# Patient Record
Sex: FEMALE | Race: WHITE | NOT HISPANIC OR LATINO | Employment: FULL TIME | URBAN - METROPOLITAN AREA
[De-identification: names, ages, dates, MRNs, and addresses within clinical notes are randomized per-mention and may not be internally consistent; named-entity substitution may affect disease eponyms.]

---

## 2020-07-10 PROBLEM — M25.473 ANKLE SWELLING: Status: ACTIVE | Noted: 2020-07-10

## 2020-07-10 PROBLEM — R73.03 PRE-DIABETES: Status: ACTIVE | Noted: 2020-07-10

## 2020-07-15 ENCOUNTER — CONSULT (OUTPATIENT)
Dept: BARIATRICS/WEIGHT MGMT | Facility: CLINIC | Age: 25
End: 2020-07-15

## 2020-07-15 VITALS
BODY MASS INDEX: 57.52 KG/M2 | SYSTOLIC BLOOD PRESSURE: 126 MMHG | DIASTOLIC BLOOD PRESSURE: 82 MMHG | WEIGHT: 293 LBS | RESPIRATION RATE: 18 BRPM | HEIGHT: 60 IN | HEART RATE: 92 BPM | TEMPERATURE: 98 F

## 2020-07-15 DIAGNOSIS — R73.03 PRE-DIABETES: ICD-10-CM

## 2020-07-15 DIAGNOSIS — E28.2 PCOS (POLYCYSTIC OVARIAN SYNDROME): ICD-10-CM

## 2020-07-15 DIAGNOSIS — Z01.818 PRE-OP EVALUATION: ICD-10-CM

## 2020-07-15 DIAGNOSIS — Z87.19 HISTORY OF ESOPHAGEAL REFLUX: ICD-10-CM

## 2020-07-15 DIAGNOSIS — E66.01 MORBID OBESITY WITH BMI OF 60.0-69.9, ADULT (HCC): Primary | ICD-10-CM

## 2020-07-15 PROBLEM — Z71.3 DIETARY COUNSELING: Status: ACTIVE | Noted: 2020-07-15

## 2020-07-15 PROBLEM — R60.9 EDEMA: Status: ACTIVE | Noted: 2020-07-10

## 2020-07-15 PROBLEM — F41.8 DEPRESSION WITH ANXIETY: Status: ACTIVE | Noted: 2020-07-15

## 2020-07-15 PROCEDURE — 99205 OFFICE O/P NEW HI 60 MIN: CPT | Performed by: NURSE PRACTITIONER

## 2020-07-15 NOTE — PROGRESS NOTES
"[facility] BARIATRIC SURGERY  [address] [address] [phone]  [address] [id number] [phone]  Dept: [phone]  7/15/2020      Kamila Ashraf.  [id number]  [id number]  1995  female      Chief Complaint of weight gain; unable to maintain weight loss    History of Present Illness:   Kamila is a 25 y.o. female who presents today for evaluation, education and consultation regarding weight loss surgery. The patient is interested in the sleeve gastrectomy.      Diet History:Kamila has been overweight for at least 15 years, has been 35 pounds or more overweight for at least 15 years, has been 100 pounds or more overweight for 15 or more years and started dieting at age 22.  The most weight Kamila lost was 20 pounds on \"no soda\" and maintained the weight loss for \"not long\". Kamila describes her eating habits as snacker and sweets eater. Kamila Ashraf has tried reduced calorie, exercising and no soda among others with success of losing up to 20 pounds, but in each instance regained the weight.    See dietician documentation for complete history.    Bariatric Surgery Evaluation: The patient is being seen for an initial visit for bariatric surgery evaluation.     Bariatric Co-morbidities:  edema, polycystic ovarian disease and depression    Patient Active Problem List   Diagnosis   • Edema   • Pre-diabetes   • Morbid obesity with BMI of 60.0-69.9, adult (CMS/[facility])   • Dietary counseling   • Pre-op evaluation   • PCOS (polycystic ovarian syndrome)   • Depression with anxiety   • History of esophageal reflux       Past Medical History:   Diagnosis Date   • Gallstones        Past Surgical History:   Procedure Laterality Date   • LAPAROSCOPIC CHOLECYSTECTOMY     • WISDOM TOOTH EXTRACTION         No Known Allergies    No current outpatient medications on file.    Social History     Socioeconomic History   • Marital status: "      Spouse name: Not on file   • Number of children: 0   • Years of education: Not on file   • Highest education level: Not on file   Occupational History   • Occupation: pharmacy tech   Tobacco Use   • Smoking status: Former Smoker   • Smokeless tobacco: Never Used   Substance and Sexual Activity   • Alcohol use: Yes     Comment: occ   • Drug use: Never   • Sexual activity: Defer       Family History   Problem Relation Age of Onset   • Obesity Mother    • Diabetes Father    • Stroke Father    • Heart attack Father    • Cancer Father    • Obesity Sister    • Diabetes Sister          Review of Systems:  Review of Systems   All other systems reviewed and are negative.      Physical Exam:  Vital Signs:  Weight: (!) 142 kg (313 lb)   Body mass index is 60.65 kg/m².  Temp: 98 °F (36.7 °C)   Heart Rate: 92   BP: 126/82     Physical Exam   Constitutional: She is oriented to person, place, and time. She appears well-developed and well-nourished.   HENT:   Head: Normocephalic and atraumatic.   Eyes: EOM are normal.   Cardiovascular: Normal rate, regular rhythm and normal heart sounds.   Pulmonary/Chest: Effort normal and breath sounds normal.   Abdominal: Soft. Bowel sounds are normal. She exhibits no distension. There is no tenderness.   Musculoskeletal: Normal range of motion.   Neurological: She is alert and oriented to person, place, and time.   Skin: Skin is warm and dry.   Psychiatric: She has a normal mood and affect. Her behavior is normal. Judgment and thought content normal.   Vitals reviewed.         Assessment:         Kamila Ashraf is a 25 y.o. year old female with medically complicated severe obesity. Weight: (!) 142 kg (313 lb), Body mass index is 60.65 kg/m². and weight related problems including edema, polycystic ovarian disease and depression.    I explained in detail the procedures that we are performing.  All of those procedures can be performed laparoscopically but there is a chance to convert to  open if any technical challenges or complications do occur.  Bariatric surgery is not cosmetic surgery but rather a tool to help a patient make a life-long commitment lifestyle changes including diet, exercise, behavior changes, and taking supplemental vitamins and minerals.    Due to the patient's BMI and co-morbidities they are at a high risk for surgery and will obtain the following:  The patient has been advised that a letter of medical support and a history and physical must be obtained from her primary care physician. A psychological evaluation will be arranged for this patient. CBC, CMP, FLP, TSH and HgbA1C will be drawn. Kamila Ashraf will obtain a pre-operative CXR and EKG.     Kamila Ashraf was screened for sleep apnea in our office today and based on their results she is low risk    Kamila Ashraf will be set up for a pre-operative diagnostic esophagogastroduodenoscopy with biopsy for evaluation. The risks and benefits of the procedure were discussed with the patient in detail and all questions were answered.  Possibility of perforation, bleeding, aspiration, anoxic brain injury, respiratory and/or cardiac arrest and death were discussed.   She received handouts regarding, all questions were answered.     The risks, benefits, alternatives, and potential complications of all of the procedures were explained in detail including, but not limited to death, anesthesia and medication adverse effect/DVT, pulmonary embolism, trocar site/incisional hernia, wound infection, abdominal infection, bleeding, failure to lose weight or gain weight and change in body image, metabolic complications with calcium, thiamine, vitamin B12, folate, iron, and anemia.    The patient was advised to start a high protein, low fat and low carbohydrate diet. The patient was given individualized information by our dietician along with handouts.     The patient was given information regarding the EDGAR educational video. EDGAR is an  internet based educational video which explains the surgical procedure and answers basic questions regarding the procedure. The patient was provided with instructions and a password to watch the video.    The patient was encouraged to start routine exercise including but not limited to 150 minutes per week. The patient received a resistance band along with a handout of exercises.     The consultation plan was reviewed with the patient.    The patient understands the surgical procedures and the different surgical options that are available.  She understands the lifestyle changes that would be required after surgery and has agreed to participate in a pre-operative and postoperative weight management program.  She also expressed understanding of possible risks, had several questions answered and desires to proceed.    I think she is a good candidate for this surgery, and is interested in a sleeve gastrectomy.    Encounter Diagnoses   Name Primary?   • Morbid obesity with BMI of 60.0-69.9, adult (CMS/[facility]) Yes   • PCOS (polycystic ovarian syndrome)    • Pre-diabetes    • History of esophageal reflux    • Pre-op evaluation        Plan:    Patient will have evaluations and follow up with bariatric dieticians and a psychologist before undergoing a multidisciplinary review of her candidacy.  We also discussed the weight loss requirement and rationale, and other program requirements.      Reshma Armstrong, APRN  7/15/2020

## 2020-07-16 NOTE — PROGRESS NOTES
"Bariatric Nutrition Counseling Interview    Patient Name:  Kamila Ashraf  YOB: 1995  Age:  25 y.o.  Sex:  female  MRN: [id number]  Date:  07/16/20    Procedure Considering:  Sleeve    Last Documented Height:    Ht Readings from Last 1 Encounters:   07/15/20 153 cm (60.24\")     Last Documented Weight:   Wt Readings from Last 1 Encounters:   07/15/20 (!) 142 kg (313 lb)      Body mass index is 60.65 kg/m².    Highest Weight:  314  Goal Weight: 160    History:  Past Medical History:   Diagnosis Date   • Gallstones      Past Surgical History:   Procedure Laterality Date   • LAPAROSCOPIC CHOLECYSTECTOMY     • WISDOM TOOTH EXTRACTION       Family History   Problem Relation Age of Onset   • Obesity Mother    • Diabetes Father    • Stroke Father    • Heart attack Father    • Cancer Father    • Obesity Sister    • Diabetes Sister      Social History     Socioeconomic History   • Marital status:      Spouse name: Not on file   • Number of children: 0   • Years of education: Not on file   • Highest education level: Not on file   Occupational History   • Occupation: pharmacy tech   Tobacco Use   • Smoking status: Former Smoker   • Smokeless tobacco: Never Used   Substance and Sexual Activity   • Alcohol use: Yes     Comment: occ   • Drug use: Never   • Sexual activity: Defer     Additional Health Issues to Consider:  PCOS, cholecystectomy per patient report    Weight History:  Always been overweight since age 8    Previous Weight Loss Efforts:  cut out sodas, exercisee  Most Successful Weight Loss Effort:  cut out sodas and lost 36lb    Eating Habits: Eat in response to stress, Eat large portions, Eat out of boredom, Snacking on high calorie foods  Eat three meals on most days?  No  Worst eating habit?  Snacking on high calorie foods, stress eating due to marital issues    How often do you eat fast food? rarely    Do you exercise regularly? (at least 3 times each week)  No    Occupation:  Pharm tech   "   Personal Support:   and mother    Assessment:  Program materials for successful weight loss before/after bariatric surgery were provided, reviewed, and discussed. The significance of taking in at least 70g of protein and 64 ounces of fluid was emphasized. The importance of routine exercise was discussed. Nutrition materials provided included a reduced calorie meal plan, protein sources, snack options, and diet guidelines post-surgery. Discussed personal habits and lifestyle behaviors that may influence diet efforts. She demonstrated a good comprehension of diet requirements and a commitment to work on personal challenges.  Patient was also provided a list of short-term goals to work towards prior to surgery. She appears to be an appropriate candidate for bariatric surgery.    Electronically signed by:  Lucy Reveles RD  07/16/20 10:49

## 2021-01-24 ENCOUNTER — INPATIENT HOSPITAL (OUTPATIENT)
Dept: URBAN - METROPOLITAN AREA HOSPITAL 107 | Facility: HOSPITAL | Age: 26
End: 2021-01-24

## 2021-01-24 DIAGNOSIS — R10.9 UNSPECIFIED ABDOMINAL PAIN: ICD-10-CM

## 2021-01-24 DIAGNOSIS — N91.5 OLIGOMENORRHEA, UNSPECIFIED: ICD-10-CM

## 2021-01-24 DIAGNOSIS — K56.609 UNSPECIFIED INTESTINAL OBSTRUCTION, UNSPECIFIED AS TO PARTIA: ICD-10-CM

## 2021-01-24 DIAGNOSIS — R82.71 BACTERIURIA: ICD-10-CM

## 2021-01-24 DIAGNOSIS — R10.13 EPIGASTRIC PAIN: ICD-10-CM

## 2021-01-24 DIAGNOSIS — R55 SYNCOPE AND COLLAPSE: ICD-10-CM

## 2021-01-24 DIAGNOSIS — R11.2 NAUSEA WITH VOMITING, UNSPECIFIED: ICD-10-CM

## 2021-01-24 PROCEDURE — 99221 1ST HOSP IP/OBS SF/LOW 40: CPT | Performed by: INTERNAL MEDICINE

## 2021-01-25 ENCOUNTER — INPATIENT HOSPITAL (OUTPATIENT)
Dept: URBAN - METROPOLITAN AREA HOSPITAL 107 | Facility: HOSPITAL | Age: 26
End: 2021-01-25

## 2021-01-25 DIAGNOSIS — R11.2 NAUSEA WITH VOMITING, UNSPECIFIED: ICD-10-CM

## 2021-01-25 DIAGNOSIS — R10.9 UNSPECIFIED ABDOMINAL PAIN: ICD-10-CM

## 2021-01-25 DIAGNOSIS — R94.5 ABNORMAL RESULTS OF LIVER FUNCTION STUDIES: ICD-10-CM

## 2021-01-25 DIAGNOSIS — R93.3 ABNORMAL FINDINGS ON DIAGNOSTIC IMAGING OF OTHER PARTS OF DI: ICD-10-CM

## 2021-01-25 DIAGNOSIS — K76.0 FATTY (CHANGE OF) LIVER, NOT ELSEWHERE CLASSIFIED: ICD-10-CM

## 2021-01-25 DIAGNOSIS — R06.89 OTHER ABNORMALITIES OF BREATHING: ICD-10-CM

## 2021-01-25 PROCEDURE — 99232 SBSQ HOSP IP/OBS MODERATE 35: CPT | Performed by: PHYSICIAN ASSISTANT

## 2021-01-26 PROCEDURE — 99231 SBSQ HOSP IP/OBS SF/LOW 25: CPT | Performed by: PHYSICIAN ASSISTANT

## 2021-04-16 ENCOUNTER — BULK ORDERING (OUTPATIENT)
Dept: CASE MANAGEMENT | Facility: OTHER | Age: 26
End: 2021-04-16

## 2021-04-16 DIAGNOSIS — Z23 IMMUNIZATION DUE: ICD-10-CM
